# Patient Record
Sex: FEMALE | Race: WHITE
[De-identification: names, ages, dates, MRNs, and addresses within clinical notes are randomized per-mention and may not be internally consistent; named-entity substitution may affect disease eponyms.]

---

## 2018-01-03 ENCOUNTER — HOSPITAL ENCOUNTER (EMERGENCY)
Age: 58
Discharge: LEFT BEFORE DISCHARGE | End: 2018-01-03

## 2018-01-03 ENCOUNTER — HOSPITAL ENCOUNTER (EMERGENCY)
Dept: HOSPITAL 91 - E/R | Age: 58
Discharge: LEFT BEFORE DISCHARGE | End: 2018-01-03
Payer: SELF-PAY

## 2018-01-03 DIAGNOSIS — Z53.21: Primary | ICD-10-CM

## 2022-10-21 ENCOUNTER — OFFICE (OUTPATIENT)
Dept: URBAN - METROPOLITAN AREA CLINIC 67 | Facility: CLINIC | Age: 62
End: 2022-10-21

## 2022-10-21 VITALS — DIASTOLIC BLOOD PRESSURE: 80 MMHG | SYSTOLIC BLOOD PRESSURE: 138 MMHG | HEIGHT: 62 IN | WEIGHT: 155 LBS

## 2022-10-21 DIAGNOSIS — Z80.0: ICD-10-CM

## 2022-10-21 DIAGNOSIS — Z12.11 SCREENING FOR COLON CANCER: ICD-10-CM

## 2022-10-21 PROCEDURE — 99243 OFF/OP CNSLTJ NEW/EST LOW 30: CPT | Performed by: NURSE PRACTITIONER

## 2022-10-21 NOTE — SERVICEHPINOTES
This is a   62   year old  female   seen   in consultation at the request of Dr. RADHA LOPEZ  . Referred for a colonoscopy. Patient reports having had a previous colonoscopy 8-9 yrs ago for lower abd pain, says it was negative. Recently had a pelvic VÍCTOR ordered by PCP for pelvic pain x1 month which was sig for left ovarian cyst-her pain resolved. 
br
br The patient has no family history of colon cancer but her sister  at 22 of gastric cancer.
br
br  Patient denies fever, nausea, vomiting, dysphagia, reflux, abdominal pain, change in bowel habits, constipation, diarrhea, rectal bleeding, melena, and significant unintentional change in weight. Denies shortness of breath and chest pain.
br
br Norwegian speaker- Melisa ramos.

## 2023-01-12 ENCOUNTER — AMBULATORY SURGICAL CENTER (OUTPATIENT)
Dept: URBAN - METROPOLITAN AREA SURGERY 42 | Facility: SURGERY | Age: 63
End: 2023-01-12

## 2023-01-12 VITALS — HEIGHT: 62 IN

## 2023-01-12 DIAGNOSIS — Z12.11 ENCOUNTER FOR SCREENING FOR MALIGNANT NEOPLASM OF COLON: ICD-10-CM

## 2023-01-12 DIAGNOSIS — K63.5 POLYP OF COLON: ICD-10-CM

## 2023-01-12 PROCEDURE — 45380 COLONOSCOPY AND BIOPSY: CPT | Performed by: INTERNAL MEDICINE

## 2023-01-12 PROCEDURE — 99152 MOD SED SAME PHYS/QHP 5/>YRS: CPT | Performed by: INTERNAL MEDICINE

## 2023-01-12 PROCEDURE — 99153 MOD SED SAME PHYS/QHP EA: CPT | Performed by: INTERNAL MEDICINE

## 2023-01-12 NOTE — SERVICEHPINOTES
This is a 62 year old female seen in consultation at the request of Dr. RADHA LOPEZ. Referred for a colonoscopy. Patient reports having had a previous colonoscopy 8-9 yrs ago for lower abd pain, says it was negative. Recently had a pelvic VÍCTOR ordered by PCP for pelvic pain x1 month which was sig for left ovarian cyst-her pain resolved.  The patient has no family history of colon cancer but her sister  at 22 of gastric cancer.Patient denies fever, nausea, vomiting, dysphagia, reflux, abdominal pain, change in bowel habits, constipation, diarrhea, rectal bleeding, melena, and significant unintentional change in weight. Denies shortness of breath and chest pain.Prydeinig speaker- Melisa ramos.